# Patient Record
Sex: FEMALE | Race: BLACK OR AFRICAN AMERICAN | NOT HISPANIC OR LATINO | URBAN - METROPOLITAN AREA
[De-identification: names, ages, dates, MRNs, and addresses within clinical notes are randomized per-mention and may not be internally consistent; named-entity substitution may affect disease eponyms.]

---

## 2024-02-29 ENCOUNTER — OV NP (OUTPATIENT)
Dept: URBAN - METROPOLITAN AREA CLINIC 94 | Facility: CLINIC | Age: 24
End: 2024-02-29

## 2024-02-29 VITALS
DIASTOLIC BLOOD PRESSURE: 72 MMHG | BODY MASS INDEX: 24.75 KG/M2 | SYSTOLIC BLOOD PRESSURE: 106 MMHG | HEIGHT: 66 IN | HEART RATE: 79 BPM | OXYGEN SATURATION: 98 % | WEIGHT: 154 LBS

## 2024-02-29 RX ORDER — DICYCLOMINE HYDROCHLORIDE 20 MG/1
1 TABLET TABLET ORAL THREE TIMES A DAY
Qty: 90 | Refills: 1 | OUTPATIENT
Start: 2024-02-29 | End: 2024-04-29

## 2024-02-29 NOTE — HPI-TODAY'S VISIT:
22 y/o F presents for NP eval of abdominal pain, diarrhea.   Abdominal pain and diarrhea have been present for 4-6 weeks. Admits it started following a stomach bug she and her family had. Abdominal pain is diffuse, present at all times, and worsens with eating. Describes it as crampy, intense pain. Has diarrhea or looser stools 4-6x/day that are brown. Diarrhea occurs post-meals and abdominal pain intensifies right before BM. Has also experienced mild gas and bloating.   Admits to mild nausea she associates with abdominal pain. No vomiting, epigastric pain, heartburn. Denies rectal bleeding, blood in stool. No mucus, fat in stool. Denies nocturnal diarrhea. No assc of food with sx - has been lactose free for some time.   Prev bowel habits were described as constipation. Prev experienced daily BMs but were hard and involved straining. States she has had a childhood problem of constipation and assc abdominal pain.   She has not attempted any OTC medications.

## 2024-03-29 ENCOUNTER — OV EP (OUTPATIENT)
Dept: URBAN - METROPOLITAN AREA CLINIC 94 | Facility: CLINIC | Age: 24
End: 2024-03-29
Payer: COMMERCIAL

## 2024-03-29 VITALS
HEART RATE: 57 BPM | DIASTOLIC BLOOD PRESSURE: 62 MMHG | HEIGHT: 66 IN | BODY MASS INDEX: 24.59 KG/M2 | TEMPERATURE: 97.2 F | WEIGHT: 153 LBS | SYSTOLIC BLOOD PRESSURE: 102 MMHG

## 2024-03-29 DIAGNOSIS — A49.8 CLOSTRIDIUM DIFFICILE INFECTION: ICD-10-CM

## 2024-03-29 PROCEDURE — 99213 OFFICE O/P EST LOW 20 MIN: CPT

## 2024-03-29 RX ORDER — VANCOMYCIN HYDROCHLORIDE 125 MG/1
1 CAPSULE CAPSULE ORAL EVERY 6 HOURS
Qty: 40 CAPSULE | Refills: 0 | Status: ACTIVE | COMMUNITY
Start: 2024-03-20 | End: 2024-03-31

## 2024-03-29 RX ORDER — DICYCLOMINE HYDROCHLORIDE 20 MG/1
1 TABLET TABLET ORAL THREE TIMES A DAY
Qty: 90 | Refills: 1 | Status: ON HOLD | COMMUNITY
Start: 2024-02-29 | End: 2024-04-29

## 2024-03-29 NOTE — HPI-TODAY'S VISIT:
24 y/o F presents for f/u d/t C.diff infxn.   Prev was experiencing abd pain, diarrhea, nausea x 4-6 weeks, GI PCR performed and C.diff returned positive on 3/15/24. Rx vancomycin and states she has begun to feel significantly better. Diarrhea has improved, now has mostly formed 1x/day BMs. Denies abdominal pain, rectal bleeding.  No fever, chills at home. Is still completing course of abx. Denies any nausea, vomiting, other GI sx.   Works in childcare. No other known sick contacts.

## 2024-06-13 ENCOUNTER — OFFICE VISIT (OUTPATIENT)
Dept: URBAN - METROPOLITAN AREA CLINIC 94 | Facility: CLINIC | Age: 24
End: 2024-06-13
Payer: COMMERCIAL

## 2024-06-13 ENCOUNTER — DASHBOARD ENCOUNTERS (OUTPATIENT)
Age: 24
End: 2024-06-13

## 2024-06-13 VITALS
HEART RATE: 66 BPM | WEIGHT: 156.6 LBS | OXYGEN SATURATION: 97 % | SYSTOLIC BLOOD PRESSURE: 110 MMHG | BODY MASS INDEX: 25.17 KG/M2 | TEMPERATURE: 97.9 F | DIASTOLIC BLOOD PRESSURE: 68 MMHG | HEIGHT: 66 IN

## 2024-06-13 DIAGNOSIS — R10.13 EPIGASTRIC PAIN: ICD-10-CM

## 2024-06-13 DIAGNOSIS — R11.0 NAUSEA: ICD-10-CM

## 2024-06-13 PROCEDURE — 99214 OFFICE O/P EST MOD 30 MIN: CPT | Performed by: INTERNAL MEDICINE

## 2024-06-13 RX ORDER — OMEPRAZOLE 40 MG/1
1 CAPSULE 30 MINUTES BEFORE MORNING MEAL CAPSULE, DELAYED RELEASE ORAL ONCE A DAY
Qty: 30 | Refills: 2 | OUTPATIENT
Start: 2024-06-13

## 2024-06-13 RX ORDER — LORATADINE 10 MG/1
1 TABLET TABLET ORAL ONCE A DAY
Status: ACTIVE | COMMUNITY

## 2024-06-13 NOTE — HPI-TODAY'S VISIT:
24 y/o F hx of C diff presents for ER f/u  Now admits to epigastric pain that was sudden and intense, woke her out of her sleep. Describes it as stabbing, initial episode lasted for several hours. Also endorses constipation prior. Admits to mild nausea, lightheadedness. Denies heartburn, regurg, dysphagia. D/c zofran, famotidine - has not attempted either yet. Admits that the epigastric pain is persistent and worsens with PO intake. Has been attemping bland diet with little relief. Denies melena, changes in bowel habits.   No FMHx of gastric, CRC.   Eval at [facility] ED 6/7/24 for epigastric pain, chest pain. Labs, CXR unremarkable. D/c with zofran, famotidine.

## 2024-06-15 LAB — H PYLORI BREATH TEST: NEGATIVE

## 2024-08-13 ENCOUNTER — OFFICE VISIT (OUTPATIENT)
Dept: URBAN - METROPOLITAN AREA CLINIC 94 | Facility: CLINIC | Age: 24
End: 2024-08-13

## 2024-08-13 RX ORDER — OMEPRAZOLE 40 MG/1
1 CAPSULE 30 MINUTES BEFORE MORNING MEAL CAPSULE, DELAYED RELEASE ORAL ONCE A DAY
Qty: 30 | Refills: 2 | COMMUNITY
Start: 2024-06-13

## 2024-08-13 RX ORDER — LORATADINE 10 MG/1
1 TABLET TABLET ORAL ONCE A DAY
COMMUNITY